# Patient Record
Sex: FEMALE | Race: WHITE | Employment: UNEMPLOYED | URBAN - METROPOLITAN AREA
[De-identification: names, ages, dates, MRNs, and addresses within clinical notes are randomized per-mention and may not be internally consistent; named-entity substitution may affect disease eponyms.]

---

## 2021-04-06 PROBLEM — Z34.00 SUPERVISION OF NORMAL FIRST PREGNANCY, ANTEPARTUM: Status: ACTIVE | Noted: 2021-04-06

## 2021-04-06 PROBLEM — Z34.00 SUPERVISION OF NORMAL FIRST PREGNANCY, ANTEPARTUM (HCC): Status: ACTIVE | Noted: 2021-04-06

## 2021-05-25 ENCOUNTER — APPOINTMENT (OUTPATIENT)
Dept: CT IMAGING | Facility: HOSPITAL | Age: 31
End: 2021-05-25
Attending: OBSTETRICS & GYNECOLOGY
Payer: COMMERCIAL

## 2021-05-25 ENCOUNTER — HOSPITAL ENCOUNTER (OUTPATIENT)
Facility: HOSPITAL | Age: 31
Setting detail: OBSERVATION
Discharge: HOME | End: 2021-05-25
Attending: OBSTETRICS & GYNECOLOGY | Admitting: OBSTETRICS & GYNECOLOGY
Payer: COMMERCIAL

## 2021-05-25 VITALS
TEMPERATURE: 98 F | BODY MASS INDEX: 24.33 KG/M2 | WEIGHT: 155 LBS | HEART RATE: 71 BPM | RESPIRATION RATE: 20 BRPM | SYSTOLIC BLOOD PRESSURE: 92 MMHG | HEIGHT: 67 IN | DIASTOLIC BLOOD PRESSURE: 57 MMHG

## 2021-05-25 PROBLEM — Z34.90 PREGNANCY (HCC): Status: ACTIVE | Noted: 2021-05-25

## 2021-05-25 PROBLEM — Z34.90 PREGNANCY: Status: ACTIVE | Noted: 2021-05-25

## 2021-05-25 PROCEDURE — 59025 FETAL NON-STRESS TEST: CPT

## 2021-05-25 PROCEDURE — 70450 CT HEAD/BRAIN W/O DYE: CPT | Performed by: OBSTETRICS & GYNECOLOGY

## 2021-05-25 PROCEDURE — 4A0HXCZ MEASUREMENT OF PRODUCTS OF CONCEPTION, CARDIAC RATE, EXTERNAL APPROACH: ICD-10-PCS | Performed by: OBSTETRICS & GYNECOLOGY

## 2021-05-25 PROCEDURE — 99204 OFFICE O/P NEW MOD 45 MIN: CPT

## 2021-05-25 RX ORDER — HYDROMORPHONE HYDROCHLORIDE 1 MG/ML
1 INJECTION, SOLUTION INTRAMUSCULAR; INTRAVENOUS; SUBCUTANEOUS ONCE
Status: DISCONTINUED | OUTPATIENT
Start: 2021-05-25 | End: 2021-05-25

## 2021-05-25 RX ORDER — HYDROCODONE BITARTRATE AND ACETAMINOPHEN 5; 325 MG/1; MG/1
1 TABLET ORAL EVERY 6 HOURS PRN
Status: DISCONTINUED | OUTPATIENT
Start: 2021-05-25 | End: 2021-05-25

## 2021-05-25 NOTE — PROGRESS NOTES
Pt is a 28year old female admitted to TRG2/TRG2-A. Pt is  33w0d intra-uterine pregnancy.     Patient presents with:   Complication: Pt presents to L&D triage w/ c/o intense headache x9 days, was told ER wait was 2+ hours     Pt states she

## 2021-05-25 NOTE — PROGRESS NOTES
As this RN was speaking to ER charge RN to coordinate transfer of pt for further evaluation, 3 minute prolonged FHR deceleration occurred. RN entered room, pt states she felt a very large fetal movement, pt repositioned to left side, EFM adjusted.  Dr. Doris Angel

## 2021-05-26 NOTE — NST
Nonstress Test   Patient: Chinedu Garcia    Gestation: 33w0d    NST:       Variability: Moderate           Accelerations: Yes           Decelerations: Variable            Baseline: 130 BPM           Uterine Irritability: Yes           Contractions: Not pres

## 2021-05-26 NOTE — PROGRESS NOTES
Pt discharged to home with headache of 6/10 after norco. Instructions given to call office tomarrtoshia

## 2021-07-10 ENCOUNTER — HOSPITAL ENCOUNTER (INPATIENT)
Facility: HOSPITAL | Age: 31
LOS: 3 days | Discharge: HOME | End: 2021-07-13
Attending: OBSTETRICS & GYNECOLOGY | Admitting: OBSTETRICS & GYNECOLOGY
Payer: COMMERCIAL

## 2021-07-10 PROCEDURE — 99214 OFFICE O/P EST MOD 30 MIN: CPT

## 2021-07-11 ENCOUNTER — ANESTHESIA EVENT (OUTPATIENT)
Dept: OBGYN UNIT | Facility: HOSPITAL | Age: 31
End: 2021-07-11
Payer: COMMERCIAL

## 2021-07-11 ENCOUNTER — ANESTHESIA (OUTPATIENT)
Dept: OBGYN UNIT | Facility: HOSPITAL | Age: 31
End: 2021-07-11
Payer: COMMERCIAL

## 2021-07-11 LAB
ANTIBODY SCREEN: NEGATIVE
BASOPHILS # BLD AUTO: 0.03 X10(3) UL (ref 0–0.2)
BASOPHILS NFR BLD AUTO: 0.3 %
DEPRECATED RDW RBC AUTO: 45.7 FL (ref 35.1–46.3)
EOSINOPHIL # BLD AUTO: 0.03 X10(3) UL (ref 0–0.7)
EOSINOPHIL NFR BLD AUTO: 0.3 %
ERYTHROCYTE [DISTWIDTH] IN BLOOD BY AUTOMATED COUNT: 13.4 % (ref 11–15)
HCT VFR BLD AUTO: 40.6 %
HGB BLD-MCNC: 13.4 G/DL
IMM GRANULOCYTES # BLD AUTO: 0.03 X10(3) UL (ref 0–1)
IMM GRANULOCYTES NFR BLD: 0.3 %
LYMPHOCYTES # BLD AUTO: 2.03 X10(3) UL (ref 1–4)
LYMPHOCYTES NFR BLD AUTO: 22.8 %
MCH RBC QN AUTO: 30.5 PG (ref 26–34)
MCHC RBC AUTO-ENTMCNC: 33 G/DL (ref 31–37)
MCV RBC AUTO: 92.5 FL
MONOCYTES # BLD AUTO: 0.5 X10(3) UL (ref 0.1–1)
MONOCYTES NFR BLD AUTO: 5.6 %
NEUTROPHILS # BLD AUTO: 6.3 X10 (3) UL (ref 1.5–7.7)
NEUTROPHILS # BLD AUTO: 6.3 X10(3) UL (ref 1.5–7.7)
NEUTROPHILS NFR BLD AUTO: 70.7 %
PLATELET # BLD AUTO: 160 10(3)UL (ref 150–450)
RBC # BLD AUTO: 4.39 X10(6)UL
RH BLOOD TYPE: POSITIVE
T PALLIDUM AB SER QL IA: NONREACTIVE
WBC # BLD AUTO: 8.9 X10(3) UL (ref 4–11)

## 2021-07-11 PROCEDURE — 86900 BLOOD TYPING SEROLOGIC ABO: CPT | Performed by: OBSTETRICS & GYNECOLOGY

## 2021-07-11 PROCEDURE — 86901 BLOOD TYPING SEROLOGIC RH(D): CPT | Performed by: OBSTETRICS & GYNECOLOGY

## 2021-07-11 PROCEDURE — 0UQMXZZ REPAIR VULVA, EXTERNAL APPROACH: ICD-10-PCS | Performed by: OBSTETRICS & GYNECOLOGY

## 2021-07-11 PROCEDURE — 0KQM0ZZ REPAIR PERINEUM MUSCLE, OPEN APPROACH: ICD-10-PCS | Performed by: OBSTETRICS & GYNECOLOGY

## 2021-07-11 PROCEDURE — 85025 COMPLETE CBC W/AUTO DIFF WBC: CPT | Performed by: OBSTETRICS & GYNECOLOGY

## 2021-07-11 PROCEDURE — 86850 RBC ANTIBODY SCREEN: CPT | Performed by: OBSTETRICS & GYNECOLOGY

## 2021-07-11 PROCEDURE — 86780 TREPONEMA PALLIDUM: CPT | Performed by: OBSTETRICS & GYNECOLOGY

## 2021-07-11 RX ORDER — LIDOCAINE HYDROCHLORIDE AND EPINEPHRINE 15; 5 MG/ML; UG/ML
INJECTION, SOLUTION EPIDURAL AS NEEDED
Status: DISCONTINUED | OUTPATIENT
Start: 2021-07-11 | End: 2021-07-11 | Stop reason: SURG

## 2021-07-11 RX ORDER — IBUPROFEN 600 MG/1
600 TABLET ORAL EVERY 6 HOURS
Status: DISCONTINUED | OUTPATIENT
Start: 2021-07-11 | End: 2021-07-13

## 2021-07-11 RX ORDER — DEXTROSE, SODIUM CHLORIDE, SODIUM LACTATE, POTASSIUM CHLORIDE, AND CALCIUM CHLORIDE 5; .6; .31; .03; .02 G/100ML; G/100ML; G/100ML; G/100ML; G/100ML
INJECTION, SOLUTION INTRAVENOUS AS NEEDED
Status: DISCONTINUED | OUTPATIENT
Start: 2021-07-11 | End: 2021-07-11

## 2021-07-11 RX ORDER — SIMETHICONE 80 MG
80 TABLET,CHEWABLE ORAL 3 TIMES DAILY PRN
Status: DISCONTINUED | OUTPATIENT
Start: 2021-07-11 | End: 2021-07-13

## 2021-07-11 RX ORDER — DOCUSATE SODIUM 100 MG/1
100 CAPSULE, LIQUID FILLED ORAL
Status: DISCONTINUED | OUTPATIENT
Start: 2021-07-11 | End: 2021-07-13

## 2021-07-11 RX ORDER — ACETAMINOPHEN 325 MG/1
650 TABLET ORAL EVERY 6 HOURS PRN
Status: DISCONTINUED | OUTPATIENT
Start: 2021-07-11 | End: 2021-07-13

## 2021-07-11 RX ORDER — ONDANSETRON 2 MG/ML
4 INJECTION INTRAMUSCULAR; INTRAVENOUS EVERY 6 HOURS PRN
Status: DISCONTINUED | OUTPATIENT
Start: 2021-07-11 | End: 2021-07-11

## 2021-07-11 RX ORDER — HYDROCODONE BITARTRATE AND ACETAMINOPHEN 5; 325 MG/1; MG/1
1 TABLET ORAL EVERY 6 HOURS PRN
Status: DISCONTINUED | OUTPATIENT
Start: 2021-07-11 | End: 2021-07-12

## 2021-07-11 RX ORDER — HYDROMORPHONE HYDROCHLORIDE 1 MG/ML
1 INJECTION, SOLUTION INTRAMUSCULAR; INTRAVENOUS; SUBCUTANEOUS EVERY 2 HOUR PRN
Status: DISCONTINUED | OUTPATIENT
Start: 2021-07-11 | End: 2021-07-11

## 2021-07-11 RX ORDER — SODIUM CHLORIDE, SODIUM LACTATE, POTASSIUM CHLORIDE, CALCIUM CHLORIDE 600; 310; 30; 20 MG/100ML; MG/100ML; MG/100ML; MG/100ML
INJECTION, SOLUTION INTRAVENOUS CONTINUOUS
Status: DISCONTINUED | OUTPATIENT
Start: 2021-07-11 | End: 2021-07-11

## 2021-07-11 RX ORDER — IBUPROFEN 600 MG/1
600 TABLET ORAL EVERY 6 HOURS PRN
Status: DISCONTINUED | OUTPATIENT
Start: 2021-07-11 | End: 2021-07-11

## 2021-07-11 RX ORDER — TRISODIUM CITRATE DIHYDRATE AND CITRIC ACID MONOHYDRATE 500; 334 MG/5ML; MG/5ML
30 SOLUTION ORAL AS NEEDED
Status: DISCONTINUED | OUTPATIENT
Start: 2021-07-11 | End: 2021-07-11

## 2021-07-11 RX ORDER — BUPIVACAINE HCL/0.9 % NACL/PF 0.25 %
5 PLASTIC BAG, INJECTION (ML) EPIDURAL AS NEEDED
Status: DISCONTINUED | OUTPATIENT
Start: 2021-07-11 | End: 2021-07-11

## 2021-07-11 RX ORDER — ACETAMINOPHEN 500 MG
500 TABLET ORAL EVERY 6 HOURS PRN
Status: DISCONTINUED | OUTPATIENT
Start: 2021-07-11 | End: 2021-07-11

## 2021-07-11 RX ORDER — LIDOCAINE HYDROCHLORIDE 10 MG/ML
INJECTION, SOLUTION EPIDURAL; INFILTRATION; INTRACAUDAL; PERINEURAL AS NEEDED
Status: DISCONTINUED | OUTPATIENT
Start: 2021-07-11 | End: 2021-07-11 | Stop reason: SURG

## 2021-07-11 RX ORDER — TERBUTALINE SULFATE 1 MG/ML
0.25 INJECTION, SOLUTION SUBCUTANEOUS AS NEEDED
Status: DISCONTINUED | OUTPATIENT
Start: 2021-07-11 | End: 2021-07-11

## 2021-07-11 RX ORDER — AMMONIA INHALANTS 0.04 G/.3ML
0.3 INHALANT RESPIRATORY (INHALATION) AS NEEDED
Status: DISCONTINUED | OUTPATIENT
Start: 2021-07-11 | End: 2021-07-11

## 2021-07-11 RX ORDER — NALBUPHINE HCL 10 MG/ML
2.5 AMPUL (ML) INJECTION
Status: DISCONTINUED | OUTPATIENT
Start: 2021-07-11 | End: 2021-07-11

## 2021-07-11 RX ORDER — BISACODYL 10 MG
10 SUPPOSITORY, RECTAL RECTAL ONCE AS NEEDED
Status: DISCONTINUED | OUTPATIENT
Start: 2021-07-11 | End: 2021-07-13

## 2021-07-11 RX ADMIN — LIDOCAINE HYDROCHLORIDE 3 ML: 10 INJECTION, SOLUTION EPIDURAL; INFILTRATION; INTRACAUDAL; PERINEURAL at 02:55:00

## 2021-07-11 RX ADMIN — LIDOCAINE HYDROCHLORIDE AND EPINEPHRINE 3 ML: 15; 5 INJECTION, SOLUTION EPIDURAL at 02:59:00

## 2021-07-11 NOTE — PROGRESS NOTES
SUBJECTIVE:   pt without complaints. Comfortable s/p epidural    OBJECTIVE:  VS:  blood pressure is 105/66 and her pulse is 82.      Fetal Surveillance:  Fetal heart variability: moderate  Fetal Heart Rate decelerations: variable  Fetal Heart Rate accelera

## 2021-07-11 NOTE — H&P
[address] and Delivery Prenatal History and Physical Interval Addendum  Please see full Prenatal Record for this pregnancy      SUBJECTIVE:    Interval History: This is a 28year old  at 39w5d admitted for painful contractions.

## 2021-07-11 NOTE — PLAN OF CARE
Problem: BIRTH - VAGINAL/ SECTION  Goal: Fetal and maternal status remain reassuring during the birth process  Description: INTERVENTIONS:  - Monitor vital signs  - Monitor fetal heart rate  - Monitor uterine activity  - Monitor labor progression protocol  - Administer induction/augmentation agents as ordered/indicated  - Utilize comfort measures and position changes to increase comfort and help labor progress  - See additional Care Plan goals for specific interventions  7/11/2021 1739 by Reed,

## 2021-07-11 NOTE — ANESTHESIA PREPROCEDURE EVALUATION
PRE-OP EVALUATION    Patient Name: Meghan Reese    Admit Diagnosis: preg state  Pregnancy    Pre-op Diagnosis: * No surgery found *        Anesthesia Procedure: LABOR ANALGESIA    * Surgery not found *    Pre-op vitals reviewed.         There is no height time  IRON OR, , Disp: , Rfl: , 7/10/2021 at Unknown time  Magnesium 200 MG Oral Tab, Take by mouth., Disp: , Rfl: , 7/10/2021 at Unknown time  Multiple Vitamin (MULTI-VITAMIN DAILY) Oral Tab, Take by mouth., Disp: , Rfl:         Allergies: Patient has no Cardiovascular    Cardiovascular exam normal.  Rhythm: regular  Rate: normal     Dental    No notable dental history. Pulmonary    Pulmonary exam normal.  Breath sounds clear to auscultation bilaterally.                Other findings            ASA:

## 2021-07-11 NOTE — PROGRESS NOTES
Dr. Oli Conrad at bedside with this RN and Romeo Muller, charge RN to evaluate patient. Assessment WNL. Dr. Oli Conrad states to administer 1mg Dilaudid at this time, and pt may have Norco 5mg in addition to Ibuprofen and Tylenol for pain management.

## 2021-07-11 NOTE — L&D DELIVERY NOTE
[facility] [[id number]]    Labor Events     labor?: No   steroids?: None  Antibiotics received during labor?: No  Antibiotics (enter # doses in comment): none  Rupture date/time: 2021 1008     Rupture type: AROM  Fluid color: Clear, P hypoventilation Good, crying              1 Minute:  5 Minute:  10 Minute:  15 Minute:  20 Minute:    Skin color: 0  1       Heart rate: 2  2       Reflex irritablity: 2  2       Muscle tone: 2  2       Respiratory effort: 2  2       Total: 8  9          A

## 2021-07-11 NOTE — PLAN OF CARE
Problem: BIRTH - VAGINAL/ SECTION  Goal: Fetal and maternal status remain reassuring during the birth process  Description: INTERVENTIONS:  - Monitor vital signs  - Monitor fetal heart rate  - Monitor uterine activity  - Monitor labor progression appropriate  - Consider OT/PT consult to assist with strengthening/mobility  - Encourage toileting schedule  Outcome: Progressing

## 2021-07-11 NOTE — PROGRESS NOTES
28year old  at 39+4 weeks gestation presents to triage with c/o painful contractions since 8pm.  Pt states that she has been josé miguel all day but they got more intense at 8.   Pt visibly uncomfortable and breathing and moaning through her contractio

## 2021-07-11 NOTE — ANESTHESIA PROCEDURE NOTES
Labor Analgesia  Performed by: Linda Yu MD  Authorized by: Linda Yu MD       General Information and Staff    Start Time:  7/11/2021 2:55 AM  End Time:  7/11/2021 2:58 AM  Anesthesiologist:  Linda Yu MD  Performed by:   Anesthesiologist  Patient

## 2021-07-11 NOTE — PROGRESS NOTES
This RN and JUDY Cabrera OBT at bedside to assist pt to bathroom. Patient became light headed and nonresponsive. This RN and JUDY Cabrera OBT laid patient back down and provided ammonia. Vital signs WNL, lochia WNL. IV fluids restarted.  Perineum WNL, this RN noted s

## 2021-07-12 LAB
BASOPHILS # BLD AUTO: 0.03 X10(3) UL (ref 0–0.2)
BASOPHILS NFR BLD AUTO: 0.3 %
DEPRECATED RDW RBC AUTO: 46.4 FL (ref 35.1–46.3)
EOSINOPHIL # BLD AUTO: 0.05 X10(3) UL (ref 0–0.7)
EOSINOPHIL NFR BLD AUTO: 0.4 %
ERYTHROCYTE [DISTWIDTH] IN BLOOD BY AUTOMATED COUNT: 13.5 % (ref 11–15)
HCT VFR BLD AUTO: 27.9 %
HGB BLD-MCNC: 9.1 G/DL
IMM GRANULOCYTES # BLD AUTO: 0.04 X10(3) UL (ref 0–1)
IMM GRANULOCYTES NFR BLD: 0.3 %
LYMPHOCYTES # BLD AUTO: 2.62 X10(3) UL (ref 1–4)
LYMPHOCYTES NFR BLD AUTO: 22.6 %
MCH RBC QN AUTO: 30.7 PG (ref 26–34)
MCHC RBC AUTO-ENTMCNC: 32.6 G/DL (ref 31–37)
MCV RBC AUTO: 94.3 FL
MONOCYTES # BLD AUTO: 0.64 X10(3) UL (ref 0.1–1)
MONOCYTES NFR BLD AUTO: 5.5 %
NEUTROPHILS # BLD AUTO: 8.21 X10 (3) UL (ref 1.5–7.7)
NEUTROPHILS # BLD AUTO: 8.21 X10(3) UL (ref 1.5–7.7)
NEUTROPHILS NFR BLD AUTO: 70.9 %
PLATELET # BLD AUTO: 126 10(3)UL (ref 150–450)
RBC # BLD AUTO: 2.96 X10(6)UL
WBC # BLD AUTO: 11.6 X10(3) UL (ref 4–11)

## 2021-07-12 PROCEDURE — 85025 COMPLETE CBC W/AUTO DIFF WBC: CPT | Performed by: OBSTETRICS & GYNECOLOGY

## 2021-07-12 RX ORDER — KETOROLAC TROMETHAMINE 30 MG/ML
30 INJECTION, SOLUTION INTRAMUSCULAR; INTRAVENOUS EVERY 6 HOURS PRN
Status: COMPLETED | OUTPATIENT
Start: 2021-07-12 | End: 2021-07-12

## 2021-07-12 RX ORDER — HYDROCODONE BITARTRATE AND ACETAMINOPHEN 5; 325 MG/1; MG/1
1 TABLET ORAL EVERY 6 HOURS PRN
Status: DISCONTINUED | OUTPATIENT
Start: 2021-07-12 | End: 2021-07-13

## 2021-07-12 RX ORDER — HYDROCODONE BITARTRATE AND ACETAMINOPHEN 5; 325 MG/1; MG/1
1 TABLET ORAL EVERY 6 HOURS PRN
Status: DISCONTINUED | OUTPATIENT
Start: 2021-07-12 | End: 2021-07-12

## 2021-07-12 RX ORDER — HYDROCODONE BITARTRATE AND ACETAMINOPHEN 10; 325 MG/1; MG/1
1 TABLET ORAL EVERY 6 HOURS PRN
Status: DISCONTINUED | OUTPATIENT
Start: 2021-07-12 | End: 2021-07-13

## 2021-07-12 RX ORDER — KETOROLAC TROMETHAMINE 30 MG/ML
30 INJECTION, SOLUTION INTRAMUSCULAR; INTRAVENOUS EVERY 6 HOURS PRN
Status: COMPLETED | OUTPATIENT
Start: 2021-07-12 | End: 2021-07-13

## 2021-07-12 NOTE — PROGRESS NOTES
Postpartum Day 1    Pt without complaints.     Temp:  [97.5 °F (36.4 °C)-98.8 °F (37.1 °C)] 98.3 °F (36.8 °C)  Pulse:  [] 69  Resp:  [14-22] 14  BP: ()/(50-87) 104/58  abd  soft, NT, ND, fundus firm below umbilicus  perineum NL lochia  extr  tra

## 2021-07-12 NOTE — PROGRESS NOTES
Pt assisted to wheelchair. Tolerated well. Pt transferred to room 1113 via wheelchair in stable condition. Report to Marshall Moy RN to assume care. Bands checked and verified on arrival to unit.

## 2021-07-12 NOTE — PROGRESS NOTES
Report to Rene Art RN, care endorsed, all questions answered at this time.  Pt and infant left in stable condition

## 2021-07-12 NOTE — PROGRESS NOTES
Labor Analgesia Follow Up Note    Patient underwent epidural anesthesia for labor analgesia,    Placenta Date/Time: 7/11/2021  4:08 PM    Delivery Date/Time[de-identified] 7/11/2021  4:04 PM    /58 (BP Location: Right arm)   Pulse 69   Temp 98.3 °F (36.8 °C) (Ora

## 2021-07-12 NOTE — PLAN OF CARE
NURSING ADMISSION NOTE  Patient admitted via Wheelchair  Oriented to room. Safety precautions initiated. Bed in low position. Call light in reach. MOVING SLOWLY, STILL C/O PERINEAL PAIN.  AIVCI4LQ/TYLENOL GIVEN WITH SOME RELIEF.  @2200 VIKY YOUNG

## 2021-07-13 VITALS
SYSTOLIC BLOOD PRESSURE: 113 MMHG | RESPIRATION RATE: 16 BRPM | TEMPERATURE: 98 F | HEART RATE: 77 BPM | DIASTOLIC BLOOD PRESSURE: 78 MMHG

## 2021-07-13 NOTE — PROGRESS NOTES
Discharge patient home as order. Teaching complete, patient feel comfortable to take care herself and  infant. Hugs and kisses off.  Sent both mom and infant to their family home at

## 2021-07-13 NOTE — PROGRESS NOTES
Postpartum Day 2    Pt without complaints.     Temp:  [99.1 °F (37.3 °C)] 99.1 °F (37.3 °C)  Pulse:  [89] 89  Resp:  [20] 20  BP: (112)/(69) 112/69  abd  soft, NT, ND, fundus firm below umbilicus  perineum NL lochia  extr  trace edema, no calf tenderness  r

## 2021-07-14 ENCOUNTER — HOSPITAL ENCOUNTER (EMERGENCY)
Facility: HOSPITAL | Age: 31
Discharge: HOME | End: 2021-07-14
Attending: EMERGENCY MEDICINE
Payer: COMMERCIAL

## 2021-07-14 VITALS
RESPIRATION RATE: 17 BRPM | WEIGHT: 168 LBS | BODY MASS INDEX: 27.99 KG/M2 | DIASTOLIC BLOOD PRESSURE: 74 MMHG | HEIGHT: 65 IN | TEMPERATURE: 99 F | SYSTOLIC BLOOD PRESSURE: 106 MMHG | OXYGEN SATURATION: 99 % | HEART RATE: 101 BPM

## 2021-07-14 DIAGNOSIS — R10.2 PERINEAL PAIN: ICD-10-CM

## 2021-07-14 DIAGNOSIS — K64.4 EXTERNAL HEMORRHOID: Primary | ICD-10-CM

## 2021-07-14 PROCEDURE — 96374 THER/PROPH/DIAG INJ IV PUSH: CPT

## 2021-07-14 PROCEDURE — 99285 EMERGENCY DEPT VISIT HI MDM: CPT

## 2021-07-14 PROCEDURE — 99284 EMERGENCY DEPT VISIT MOD MDM: CPT

## 2021-07-14 RX ORDER — HYDROCODONE BITARTRATE AND ACETAMINOPHEN 5; 325 MG/1; MG/1
1-2 TABLET ORAL EVERY 6 HOURS PRN
Qty: 12 TABLET | Refills: 0 | Status: SHIPPED | OUTPATIENT
Start: 2021-07-14 | End: 2021-07-14

## 2021-07-14 RX ORDER — HYDROMORPHONE HYDROCHLORIDE 1 MG/ML
0.5 INJECTION, SOLUTION INTRAMUSCULAR; INTRAVENOUS; SUBCUTANEOUS ONCE
Status: COMPLETED | OUTPATIENT
Start: 2021-07-14 | End: 2021-07-14

## 2021-07-14 RX ORDER — DIBUCAINE 0.28 G/28G
1 OINTMENT TOPICAL 3 TIMES DAILY
Qty: 1 EACH | Refills: 0 | Status: SHIPPED | OUTPATIENT
Start: 2021-07-14

## 2021-07-14 RX ORDER — HYDROCODONE BITARTRATE AND ACETAMINOPHEN 5; 325 MG/1; MG/1
1-2 TABLET ORAL EVERY 6 HOURS PRN
Qty: 12 TABLET | Refills: 0 | Status: SHIPPED | OUTPATIENT
Start: 2021-07-14 | End: 2021-07-21

## 2021-07-14 RX ORDER — IBUPROFEN 600 MG/1
600 TABLET ORAL ONCE
Status: COMPLETED | OUTPATIENT
Start: 2021-07-14 | End: 2021-07-14

## 2021-07-14 RX ORDER — DIBUCAINE 0.28 G/28G
1 OINTMENT TOPICAL 3 TIMES DAILY
Qty: 1 EACH | Refills: 0 | Status: SHIPPED | OUTPATIENT
Start: 2021-07-14 | End: 2021-07-14

## 2021-07-14 RX ORDER — ACETAMINOPHEN 500 MG
1000 TABLET ORAL ONCE
Status: COMPLETED | OUTPATIENT
Start: 2021-07-14 | End: 2021-07-14

## 2021-07-14 NOTE — ED INITIAL ASSESSMENT (HPI)
Pt to ED with c/o anal pain. Pt had vaginal delivery on 7/11 and sts her rectum tore and sutures were applied. Pt sts she also has a hemorrhoid and pain has been increasing.

## 2021-07-16 ENCOUNTER — TELEPHONE (OUTPATIENT)
Dept: OBGYN UNIT | Facility: HOSPITAL | Age: 31
End: 2021-07-16

## 2021-07-16 NOTE — PROGRESS NOTES
07/16/21 1621   Cradle Call Follow up   Cradle call follow up date 07/16/21   Follow up needed Completed   Hypertension or Preeclampsia during pregnancy or delivery No   Outgoing Cradle Call completed: Mom reports that infant are doing well.  Pt herse

## 2021-07-21 ENCOUNTER — NURSE ONLY (OUTPATIENT)
Dept: LACTATION | Facility: HOSPITAL | Age: 31
End: 2021-07-21
Attending: OBSTETRICS & GYNECOLOGY
Payer: COMMERCIAL

## 2021-07-21 VITALS — TEMPERATURE: 98 F

## 2021-07-21 PROCEDURE — 99213 OFFICE O/P EST LOW 20 MIN: CPT

## 2021-08-18 PROBLEM — Z34.00 SUPERVISION OF NORMAL FIRST PREGNANCY, ANTEPARTUM (HCC): Status: RESOLVED | Noted: 2021-04-06 | Resolved: 2021-08-18

## 2021-08-18 PROBLEM — Z34.00 SUPERVISION OF NORMAL FIRST PREGNANCY, ANTEPARTUM: Status: RESOLVED | Noted: 2021-04-06 | Resolved: 2021-08-18

## 2021-08-31 PROBLEM — Z34.90 PREGNANCY: Status: RESOLVED | Noted: 2021-05-25 | Resolved: 2021-08-31

## 2021-08-31 PROBLEM — Z34.90 PREGNANCY (HCC): Status: RESOLVED | Noted: 2021-05-25 | Resolved: 2021-08-31

## 2024-08-06 NOTE — ED PROVIDER NOTES
Patient Seen in: [facility] Emergency Department      History   Patient presents with:  Anal Problem    Stated Complaint: pain post partum    HPI/Subjective:   HPI    This is a 26-year-old woman G1, P1, 3 days postpartum here with complaint of perin Xanax as needed for anxiety. Potassium as directed tomorrow. Drink plenty of fluids. Return for worse condition.    moist.   Cardiovascular:  Normal rate and regular rhythm. No Edema  Pulmonary:  Pulmonary effort is normal.  Normal breath sounds. No wheezing, rhonchi or rales.    Abdominal: Soft nontender nondistended, normal bowel sounds, no guarding no rebound tendern List    START taking these medications    dibucaine 1 % External Ointment  Apply 1 Application topically 3 (three) times daily.   Qty: 1 each Refills: 0    HYDROcodone-acetaminophen 5-325 MG Oral Tab  Take 1-2 tablets by mouth every 6 (six) hours as needed

## 2024-09-17 ENCOUNTER — ANESTHESIA (OUTPATIENT)
Dept: SURGERY | Facility: HOSPITAL | Age: 34
End: 2024-09-17
Payer: COMMERCIAL

## 2024-09-17 ENCOUNTER — HOSPITAL ENCOUNTER (EMERGENCY)
Facility: HOSPITAL | Age: 34
Discharge: HOME | End: 2024-09-17
Attending: EMERGENCY MEDICINE
Payer: COMMERCIAL

## 2024-09-17 ENCOUNTER — ANESTHESIA EVENT (OUTPATIENT)
Dept: SURGERY | Facility: HOSPITAL | Age: 34
End: 2024-09-17
Payer: COMMERCIAL

## 2024-09-17 ENCOUNTER — APPOINTMENT (OUTPATIENT)
Dept: CT IMAGING | Facility: HOSPITAL | Age: 34
End: 2024-09-17
Attending: EMERGENCY MEDICINE
Payer: COMMERCIAL

## 2024-09-17 VITALS
HEART RATE: 63 BPM | OXYGEN SATURATION: 100 % | RESPIRATION RATE: 20 BRPM | WEIGHT: 140 LBS | BODY MASS INDEX: 22.5 KG/M2 | DIASTOLIC BLOOD PRESSURE: 77 MMHG | HEIGHT: 66 IN | SYSTOLIC BLOOD PRESSURE: 116 MMHG | TEMPERATURE: 97 F

## 2024-09-17 DIAGNOSIS — K35.80 ACUTE APPENDICITIS, UNSPECIFIED ACUTE APPENDICITIS TYPE: Primary | ICD-10-CM

## 2024-09-17 DIAGNOSIS — K37 APPENDICITIS: ICD-10-CM

## 2024-09-17 PROCEDURE — 88304 TISSUE EXAM BY PATHOLOGIST: CPT | Performed by: SURGERY

## 2024-09-17 PROCEDURE — 96365 THER/PROPH/DIAG IV INF INIT: CPT

## 2024-09-17 PROCEDURE — 74177 CT ABD & PELVIS W/CONTRAST: CPT | Performed by: EMERGENCY MEDICINE

## 2024-09-17 PROCEDURE — 0DTJ4ZZ RESECTION OF APPENDIX, PERCUTANEOUS ENDOSCOPIC APPROACH: ICD-10-PCS | Performed by: SURGERY

## 2024-09-17 PROCEDURE — 96375 TX/PRO/DX INJ NEW DRUG ADDON: CPT

## 2024-09-17 PROCEDURE — 99285 EMERGENCY DEPT VISIT HI MDM: CPT

## 2024-09-17 RX ORDER — SODIUM CHLORIDE, SODIUM LACTATE, POTASSIUM CHLORIDE, CALCIUM CHLORIDE 600; 310; 30; 20 MG/100ML; MG/100ML; MG/100ML; MG/100ML
INJECTION, SOLUTION INTRAVENOUS CONTINUOUS
Status: DISCONTINUED | OUTPATIENT
Start: 2024-09-17 | End: 2024-09-17

## 2024-09-17 RX ORDER — HYDROMORPHONE HYDROCHLORIDE 1 MG/ML
0.6 INJECTION, SOLUTION INTRAMUSCULAR; INTRAVENOUS; SUBCUTANEOUS EVERY 5 MIN PRN
Status: DISCONTINUED | OUTPATIENT
Start: 2024-09-17 | End: 2024-09-17

## 2024-09-17 RX ORDER — METRONIDAZOLE 500 MG/100ML
INJECTION, SOLUTION INTRAVENOUS AS NEEDED
Status: DISCONTINUED | OUTPATIENT
Start: 2024-09-17 | End: 2024-09-19 | Stop reason: SURG

## 2024-09-17 RX ORDER — DEXAMETHASONE SODIUM PHOSPHATE 4 MG/ML
VIAL (ML) INJECTION AS NEEDED
Status: DISCONTINUED | OUTPATIENT
Start: 2024-09-17 | End: 2024-09-19 | Stop reason: SURG

## 2024-09-17 RX ORDER — ACETAMINOPHEN 500 MG
1000 TABLET ORAL ONCE AS NEEDED
Status: COMPLETED | OUTPATIENT
Start: 2024-09-17 | End: 2024-09-17

## 2024-09-17 RX ORDER — LIDOCAINE HYDROCHLORIDE 10 MG/ML
INJECTION, SOLUTION EPIDURAL; INFILTRATION; INTRACAUDAL; PERINEURAL AS NEEDED
Status: DISCONTINUED | OUTPATIENT
Start: 2024-09-17 | End: 2024-09-19 | Stop reason: SURG

## 2024-09-17 RX ORDER — HYDROMORPHONE HYDROCHLORIDE 1 MG/ML
0.5 INJECTION, SOLUTION INTRAMUSCULAR; INTRAVENOUS; SUBCUTANEOUS EVERY 30 MIN PRN
Status: DISCONTINUED | OUTPATIENT
Start: 2024-09-17 | End: 2024-09-17

## 2024-09-17 RX ORDER — TRAZODONE HYDROCHLORIDE 100 MG/1
100 TABLET ORAL NIGHTLY PRN
COMMUNITY

## 2024-09-17 RX ORDER — ROCURONIUM BROMIDE 10 MG/ML
INJECTION, SOLUTION INTRAVENOUS AS NEEDED
Status: DISCONTINUED | OUTPATIENT
Start: 2024-09-17 | End: 2024-09-19 | Stop reason: SURG

## 2024-09-17 RX ORDER — KETOROLAC TROMETHAMINE 30 MG/ML
INJECTION, SOLUTION INTRAMUSCULAR; INTRAVENOUS AS NEEDED
Status: DISCONTINUED | OUTPATIENT
Start: 2024-09-17 | End: 2024-09-19 | Stop reason: SURG

## 2024-09-17 RX ORDER — PROCHLORPERAZINE EDISYLATE 5 MG/ML
5 INJECTION INTRAMUSCULAR; INTRAVENOUS EVERY 8 HOURS PRN
Status: DISCONTINUED | OUTPATIENT
Start: 2024-09-17 | End: 2024-09-17

## 2024-09-17 RX ORDER — NALOXONE HYDROCHLORIDE 0.4 MG/ML
0.08 INJECTION, SOLUTION INTRAMUSCULAR; INTRAVENOUS; SUBCUTANEOUS AS NEEDED
Status: DISCONTINUED | OUTPATIENT
Start: 2024-09-17 | End: 2024-09-17

## 2024-09-17 RX ORDER — HYDROMORPHONE HYDROCHLORIDE 1 MG/ML
0.4 INJECTION, SOLUTION INTRAMUSCULAR; INTRAVENOUS; SUBCUTANEOUS EVERY 5 MIN PRN
Status: DISCONTINUED | OUTPATIENT
Start: 2024-09-17 | End: 2024-09-17

## 2024-09-17 RX ORDER — NEOSTIGMINE METHYLSULFATE 1 MG/ML
INJECTION INTRAVENOUS AS NEEDED
Status: DISCONTINUED | OUTPATIENT
Start: 2024-09-17 | End: 2024-09-19 | Stop reason: SURG

## 2024-09-17 RX ORDER — DROSPIRENONE AND ETHINYL ESTRADIOL 0.02-3(28)
1 KIT ORAL DAILY
COMMUNITY
Start: 2024-08-20 | End: 2025-08-20

## 2024-09-17 RX ORDER — HYDROCODONE BITARTRATE AND ACETAMINOPHEN 5; 325 MG/1; MG/1
2 TABLET ORAL ONCE AS NEEDED
Status: COMPLETED | OUTPATIENT
Start: 2024-09-17 | End: 2024-09-17

## 2024-09-17 RX ORDER — GLYCOPYRROLATE 0.2 MG/ML
INJECTION, SOLUTION INTRAMUSCULAR; INTRAVENOUS AS NEEDED
Status: DISCONTINUED | OUTPATIENT
Start: 2024-09-17 | End: 2024-09-19 | Stop reason: SURG

## 2024-09-17 RX ORDER — ONDANSETRON 2 MG/ML
4 INJECTION INTRAMUSCULAR; INTRAVENOUS EVERY 6 HOURS PRN
Status: DISCONTINUED | OUTPATIENT
Start: 2024-09-17 | End: 2024-09-17

## 2024-09-17 RX ORDER — TRAMADOL HYDROCHLORIDE 50 MG/1
50 TABLET ORAL EVERY 6 HOURS PRN
Qty: 30 TABLET | Refills: 0 | Status: SHIPPED | OUTPATIENT
Start: 2024-09-17 | End: 2024-09-27

## 2024-09-17 RX ORDER — CEFAZOLIN SODIUM 1 G/3ML
INJECTION, POWDER, FOR SOLUTION INTRAMUSCULAR; INTRAVENOUS AS NEEDED
Status: DISCONTINUED | OUTPATIENT
Start: 2024-09-17 | End: 2024-09-19 | Stop reason: SURG

## 2024-09-17 RX ORDER — ONDANSETRON 2 MG/ML
INJECTION INTRAMUSCULAR; INTRAVENOUS
Status: COMPLETED
Start: 2024-09-17 | End: 2024-09-17

## 2024-09-17 RX ORDER — BUPIVACAINE HYDROCHLORIDE 5 MG/ML
INJECTION, SOLUTION EPIDURAL; INTRACAUDAL AS NEEDED
Status: DISCONTINUED | OUTPATIENT
Start: 2024-09-17 | End: 2024-09-17 | Stop reason: HOSPADM

## 2024-09-17 RX ORDER — HYDROCODONE BITARTRATE AND ACETAMINOPHEN 5; 325 MG/1; MG/1
1 TABLET ORAL ONCE AS NEEDED
Status: COMPLETED | OUTPATIENT
Start: 2024-09-17 | End: 2024-09-17

## 2024-09-17 RX ORDER — LIDOCAINE HYDROCHLORIDE 10 MG/ML
INJECTION, SOLUTION INFILTRATION; PERINEURAL AS NEEDED
Status: DISCONTINUED | OUTPATIENT
Start: 2024-09-17 | End: 2024-09-17 | Stop reason: HOSPADM

## 2024-09-17 RX ORDER — TAZAROTENE 1 MG/G
GEL TOPICAL NIGHTLY
COMMUNITY
Start: 2024-02-12

## 2024-09-17 RX ORDER — SODIUM CHLORIDE, SODIUM LACTATE, POTASSIUM CHLORIDE, CALCIUM CHLORIDE 600; 310; 30; 20 MG/100ML; MG/100ML; MG/100ML; MG/100ML
INJECTION, SOLUTION INTRAVENOUS CONTINUOUS PRN
Status: DISCONTINUED | OUTPATIENT
Start: 2024-09-17 | End: 2024-09-19 | Stop reason: SURG

## 2024-09-17 RX ORDER — MIDAZOLAM HYDROCHLORIDE 1 MG/ML
1 INJECTION INTRAMUSCULAR; INTRAVENOUS EVERY 5 MIN PRN
Status: DISCONTINUED | OUTPATIENT
Start: 2024-09-17 | End: 2024-09-17

## 2024-09-17 RX ORDER — ONDANSETRON 2 MG/ML
INJECTION INTRAMUSCULAR; INTRAVENOUS AS NEEDED
Status: DISCONTINUED | OUTPATIENT
Start: 2024-09-17 | End: 2024-09-19 | Stop reason: SURG

## 2024-09-17 RX ORDER — KETOROLAC TROMETHAMINE 15 MG/ML
15 INJECTION, SOLUTION INTRAMUSCULAR; INTRAVENOUS ONCE
Status: COMPLETED | OUTPATIENT
Start: 2024-09-17 | End: 2024-09-17

## 2024-09-17 RX ORDER — HYDROMORPHONE HYDROCHLORIDE 1 MG/ML
0.2 INJECTION, SOLUTION INTRAMUSCULAR; INTRAVENOUS; SUBCUTANEOUS EVERY 5 MIN PRN
Status: DISCONTINUED | OUTPATIENT
Start: 2024-09-17 | End: 2024-09-17

## 2024-09-17 RX ORDER — DIPHENHYDRAMINE HYDROCHLORIDE 50 MG/ML
12.5 INJECTION INTRAMUSCULAR; INTRAVENOUS AS NEEDED
Status: DISCONTINUED | OUTPATIENT
Start: 2024-09-17 | End: 2024-09-17

## 2024-09-17 RX ORDER — MEPERIDINE HYDROCHLORIDE 25 MG/ML
12.5 INJECTION INTRAMUSCULAR; INTRAVENOUS; SUBCUTANEOUS AS NEEDED
Status: DISCONTINUED | OUTPATIENT
Start: 2024-09-17 | End: 2024-09-17

## 2024-09-17 RX ADMIN — DEXAMETHASONE SODIUM PHOSPHATE 8 MG: 4 MG/ML VIAL (ML) INJECTION at 16:05:00

## 2024-09-17 RX ADMIN — LIDOCAINE HYDROCHLORIDE 100 MG: 10 INJECTION, SOLUTION EPIDURAL; INFILTRATION; INTRACAUDAL; PERINEURAL at 16:05:00

## 2024-09-17 RX ADMIN — GLYCOPYRROLATE 0.6 MG: 0.2 INJECTION, SOLUTION INTRAMUSCULAR; INTRAVENOUS at 16:59:00

## 2024-09-17 RX ADMIN — METRONIDAZOLE 500 MG: 500 INJECTION, SOLUTION INTRAVENOUS at 16:16:00

## 2024-09-17 RX ADMIN — KETOROLAC TROMETHAMINE 30 MG: 30 INJECTION, SOLUTION INTRAMUSCULAR; INTRAVENOUS at 17:08:00

## 2024-09-17 RX ADMIN — CEFAZOLIN SODIUM 2 G: 1 INJECTION, POWDER, FOR SOLUTION INTRAMUSCULAR; INTRAVENOUS at 16:16:00

## 2024-09-17 RX ADMIN — SODIUM CHLORIDE, SODIUM LACTATE, POTASSIUM CHLORIDE, CALCIUM CHLORIDE: 600; 310; 30; 20 INJECTION, SOLUTION INTRAVENOUS at 16:01:00

## 2024-09-17 RX ADMIN — ONDANSETRON 4 MG: 2 INJECTION INTRAMUSCULAR; INTRAVENOUS at 16:05:00

## 2024-09-17 RX ADMIN — ROCURONIUM BROMIDE 40 MG: 10 INJECTION, SOLUTION INTRAVENOUS at 16:05:00

## 2024-09-17 RX ADMIN — SODIUM CHLORIDE, SODIUM LACTATE, POTASSIUM CHLORIDE, CALCIUM CHLORIDE: 600; 310; 30; 20 INJECTION, SOLUTION INTRAVENOUS at 16:57:00

## 2024-09-17 RX ADMIN — NEOSTIGMINE METHYLSULFATE 3 MG: 1 INJECTION INTRAVENOUS at 16:59:00

## 2024-09-17 NOTE — ED QUICK NOTES
Orders for admission, patient is aware of plan and ready to go upstairs. Any questions, please call ED RN Anyi at extension [phone].     Patient Covid vaccination status: Fully vaccinated     COVID Test Ordered in ED: None    COVID Suspicion at Admission: N/A    Running Infusions:  None    Mental Status/LOC at time of transport: AOx4    Other pertinent information:   CIWA score: N/A   NIH score:  N/A

## 2024-09-17 NOTE — ED PROVIDER NOTES
Patient Seen in: [facility] Emergency Department      History     Chief Complaint   Patient presents with    Abnormal Result     Stated Complaint:     Subjective:   HPI    34-year-old female comes to the hospital complaint of having difficulty with nausea, vomiting and diarrhea in the last 24 hours.  She went to an urgent care and was given IV fluid and Zofran and was sent here to rule out an appendicitis.  She does have some pain in the right lower quadrant.  She is no longer feeling nauseous after Zofran and IV fluids were given at this time.  Her symptoms been present over the last 2 days.  She denies any headaches or dizziness.  There is no known fevers.  No chest pain or troubles breathing.  She denying any other complaints at this time.  I did review the chart and labs which showed she did have an elevated white count of 16.3 thousand.  Her urine showed no infection and her pregnancy test was negative with unremarkable electrolytes.    Objective:   Past Medical History:    Real time reverse transcriptase PCR positive for COVID-19 virus              Past Surgical History:   Procedure Laterality Date    Ablation  10/03/2019    Polyp removal    Laparoscopic  2011    Ectopic pregnancy                Social History     Socioeconomic History    Marital status:    Tobacco Use    Smoking status: Former    Smokeless tobacco: Never   Vaping Use    Vaping status: Never Used   Substance and Sexual Activity    Alcohol use: Not Currently     Comment: per pt, prior pregnancy once a year.    Drug use: Never    Sexual activity: Yes     Partners: Male              Review of Systems    Positive for stated Chief Complaint: Abnormal Result    Other systems are as noted in HPI.  Constitutional and vital signs reviewed.      All other systems reviewed and negative except as noted above.    Physical Exam     ED Triage Vitals   BP 09/17/24 1038 108/78   Pulse 09/17/24 1038 108   Resp 09/17/24 1038 16   Temp 09/17/24 1040  98 °F (36.7 °C)   Temp src 09/17/24 1040 Temporal   SpO2 09/17/24 1038 99 %   O2 Device 09/17/24 1038 None (Room air)       Current Vitals:   Vital Signs  BP: 106/74  Pulse: 111  Resp: 16  Temp: 98 °F (36.7 °C)  Temp src: Temporal  MAP (mmHg): 86    Oxygen Therapy  SpO2: 100 %  O2 Device: None (Room air)            Physical Exam    HEENT : NCAT, EOMI, PEERL,  neck supple, no JVD, trachea midline, No LAD  Heart: S1S2 normal. No murmurs, regular rate and rhythm  Lungs: Clear to auscultation bilaterally  Abdomen: Soft  right lower quadrant region is tender nondistended normal active bowel sounds without rebound, guarding or masses noted  Back nontender without CVA tenderness  Extremity no clubbing, cyanosis or edema noted.  Full range of motion noted without tenderness  Neuro: No focal deficits noted    All measures to prevent infection transmission during my interaction with the patient were taken.  The patient was already wearing droplet mask on my arrival to the room.  Personal protective equipment including a droplet mask as well as gloves were worn throughout the duration of my exam.  Hand washing was performed prior to and after the exam.  Stethoscope and equipment used during my examination was cleaned with a super Sani cloth germicidal wipe following the exam.    ED Course   Labs Reviewed - No data to display       ED Course as of 09/17/24 1212  ------------------------------------------------------------  Time: 09/17 1211  Comment: While here I did review the patient's lab values that showed an elevated white count of 16.3 thousand.  Her pregnancy test was negative.  She did CT of the abdomen pelvis that I interpreted here consistent with acute appendicitis.  With radiology report as well.  I spoke with surgery at this time the patient will be taken to surgery for further management.     CT ABDOMEN+PELVIS(CONTRAST ONLY)(CPT=74177)    Result Date: 9/17/2024  PROCEDURE:  CT ABDOMEN+PELVIS (CONTRAST ONLY)  (CPT=74177)  COMPARISON:  None.  INDICATIONS:  rlq pain  TECHNIQUE:  CT scanning was performed from the dome of the diaphragm to the pubic symphysis with non-ionic intravenous contrast material. Post contrast coronal MPR imaging was performed.  Dose reduction techniques were used. Dose information is transmitted to the ACR (American College of Radiology) NRDR (National Radiology Data Registry) which includes the Dose Index Registry.  PATIENT STATED HISTORY:(As transcribed by Technologist)  Patient complains of right lower quadrant abdominal pain.   CONTRAST USED:  80cc of Isovue 370  FINDINGS:  LIVER:  No enlargement, atrophy, abnormal density, or significant focal lesion.  BILIARY:  No visible dilatation or calcification.  PANCREAS:  No lesion, fluid collection, ductal dilatation, or atrophy.  SPLEEN:  No enlargement or focal lesion.  KIDNEYS:  No mass, obstruction, or calcification.  ADRENALS:  No mass or enlargement.  AORTA/VASCULAR:  No aneurysm or dissection.  RETROPERITONEUM:  No mass or adenopathy.  BOWEL/MESENTERY:  No evidence of bowel obstruction.  Markedly dilated, hyperemic appendix of the right lower quadrant measures 12 mm in caliber with adjacent fluid and inflammatory stranding consistent with acute appendicitis.  No definitive evidence of appendiceal rupture or regional abscess formation. ABDOMINAL WALL:  No mass or hernia.  URINARY BLADDER:  No visible focal wall thickening, lesion, or calculus.  PELVIC NODES:  No adenopathy.  PELVIC ORGANS:  No visible mass.  Pelvic organs appropriate for patient age.  BONES:  No bony lesion or fracture.  LUNG BASES:  No visible pulmonary or pleural disease.  OTHER:  Negative.             CONCLUSION:   Dilated, hyperemic appendix of the right lower quadrant with adjacent fluid and inflammatory stranding consistent with acute appendicitis.  No definitive features of appendiceal rupture.  No organized regional abscess.  Findings discussed with  at 11:50  a.m. Central standard time on 09/17/2024.   LOCATION:  [place] [address]   Dictated by (CST): Manuel Macario MD on 9/17/2024 at 11:46 AM     Finalized by (CST): Manuel Macario MD on 9/17/2024 at 11:50 AM        Medications   ketorolac (Toradol) 15 MG/ML injection 15 mg (15 mg Intravenous Given 9/17/24 1058)   iopamidol 76% (ISOVUE-370) injection for power injector (80 mL Intravenous Given 9/17/24 1141)              MDM      Differential diagnosis includes acute appendicitis, diverticulitis, gastroenteritis but limited such.  Patient's workup here is consistent with acute appendicitis.  I spoke with Dr. Moreno from surgery at this time the patient be taken to surgery for further care.      This note was prepared using Dragon Medical voice recognition dictation software.  As a result errors may occur.  When identified to these areas have been corrected.  While every attempt is made to correct errors during dictation discrepancies may still exist.  Please contact if there are any errors.                                   Medical Decision Making      Disposition and Plan     Clinical Impression:  1. Acute appendicitis, unspecified acute appendicitis type         Disposition:  Send to or/cath/gi  9/17/2024 12:12 pm    Follow-up:  No follow-up provider specified.        Medications Prescribed:  Current Discharge Medication List

## 2024-09-17 NOTE — ED INITIAL ASSESSMENT (HPI)
Pt states abd pain yesterday with N/V.  Pt denies fevers.  Pt states she was seen at  prior to arrival and given 8mg zofran and 1L IV fluids.

## 2024-09-17 NOTE — DISCHARGE INSTRUCTIONS
You received a drug called Toradol which is an Anti Inflammatory at: 5pm  If you are allowed to take Anti inflammatories:    Do not take any Anti Inflammatory like Motrin, Aleve or Ibuprophen until after: 11pm  Please report any suspected allergic reactions or bleeding issues to your doctor        Home Care Instructions  LAPAROSCOPIC SURGERY    MEDICATIONS  You should anticipate moderate to severe pain for the first few days.  Your surgeon has prescribed for you a pain medication, usually Norco.  Take the pain medication as prescribed.  You may use Ibuprofen (Motrin) 600 mg 3 times a day with the Norco or by itself if needed.  If you experience severe nausea or vomiting stop the pain medication and call our office.    DIET  Your diet should be as you tolerate.  Eat light foods the first 24 hours.  Do not drink alcohol while taking the pain medication.    ACTIVITY  You should not drive for the first 3 to 5 days or if you are still requiring prescription pain medication.    No lifting greater than 10 to 15 pounds for 3 weeks.  Avoid strenuous activity such as running and physical workouts for 3 weeks.  Normal daily activities are fine, such as climbing stairs.  You may shower after 24 hours.  The steri-strips can get wet but should not be under water in a bath.  You may return to work as instructed by your surgeon.    CARE OF SURGICAL SITE  You may remove the outer gauze pad in 24 hours and you may shower over the steri-strips which are directly on the incisions.  Leave these steri-strips on until you are seen in  the office.  Pain, colorful bruising and slight swelling at the incision sites is usually normal.  If you experience fever, chills, inability to urinate, nausea with vomiting, severe diarrhea or severe cramping, abdominal pain, please contact your surgeon.    APPOINTMENT  Call the office when you arrive home after surgery and make an appointment to see your surgeon in one week.  Should you develop any  problems prior to your scheduled appointment, do not hesitate to call the office.      Norco was given to you at: 6:40 pm  Next dose tramadol due: 12:40 am    No driving or drinking alcohol while taking

## 2024-09-17 NOTE — OPERATIVE REPORT
[facility]                                                         Operative Note    Chinedu Garcia Location: ASC Perioperative   CSN [id number] MRN [id number]   Admission Date 9/17/2024 Procedure Date 9/17/2024   Attending Physician Seth Mccracken MD Procedure Physician Seth Mccracken MD     Pre-Operative Diagnosis: Appendicitis [K37]     Post-Operative Diagnosis: Appendicitis [K37]    Procedure Performed: LAPAROSCOPIC APPENDECTOMY    Surgeon: Seth Mccracken MD     Assistant(s):          Anesthesia: General       Complications: none       Estimated Blood Loss: Blood Output: 5 mL (9/17/2024  5:02 PM)              Operative Summary: Patient was taken to the operating room and placed in a supine position.  They were diagnosed with acute appendicitis.  A consent was obtained for a laparoscopic appendectomy.  The skin and deeper tissues were infiltrated with 1% Xylocaine with epinephrine mixed with half percent Marcaine plain just below the umbilicus.  An incision was made and dissection was carried down to the fascia.  The fascia was incised and a blunt-tipped Lloyd trocar was placed in an open technique after stay sutures were placed on either side of the opening.  The abdomen was insufflated with 15 mm of carbon dioxide.  Two 5 mm trochars were placed in the lower abdomen under direct laparoscopic guidance and the patient was positioned.  A bilateral trans-abdominis plane block was performed by infusing 20 cc of a mixture of half percent Marcaine 1% Xylocaine and the trans-abdominis plane bilaterally.  The cecum was appreciated.  The appendix was noted to be acutely inflamed.  There was adjacent phlegmon. No rupture.  The mesoappendix was ligated with the sono incision energy device.  This dissection was carried down to the level of the base of the appendix.  The appendix was then amputated from the cecum with the Endo mechanical TONYA 45 mm stapler.  The appendix was placed in an Endobag and retrieved from the  abdominal cavity.  This was sent to pathology.  The abdomen was then irrigated copiously with a combination of antibiotic irrigation and saline solution.  The staple line was visualized and was intact.  Hemostasis was achieved.  The trochars were removed.  The fascia at the umbilicus was reapproximated with an 0 Vicryl suture.  The wounds were closed with 4-0 Monocryl and Dermabond.  The patient was awoken and taken to the recovery room in satisfactory condition.  There were no complications during the case.          Seth Mccracken MD  9/17/2024  5:19 PM

## 2024-09-17 NOTE — CONSULTS
[facility]  Report of Consultation    Chinedu Garcia Patient Status:  Emergency    1990 MRN [id number]   Formerly [facility] EMERGENCY DEPARTMENT Attending Beto Murray MD   Hosp Day # 0 PCP Kwadwo Villagomez MD     2024    Reason for Consultation:    Surgical Consultation     CC:   Chief Complaint   Patient presents with    Abnormal Result        History of Present Illness:    Chinedu Garcia is a a(n) 34 year old female. Patient complains of abdominal cramping, specific RLQ, nausea and emesis beginning yesterday. She did go to an ER at [facility] and was told the wait would be 10 hrs to be evaluated. She ultimately left at 1 am going home. Evaluated by [facility] today, sent to ER for CT scan  CT scan obtained revealing acute appendicitis   Patient with leukocytosis 15K with left shift.   Previous laparoscopy for ectopic  No use of anticoagulation      Past Medical History:    Past Medical History:    Real time reverse transcriptase PCR positive for COVID-19 virus       Past Surgical History:    Past Surgical History:   Procedure Laterality Date    Ablation  10/03/2019    Polyp removal    Laparoscopic      Ectopic pregnancy       Family History:    Family History   Problem Relation Age of Onset    High Blood Pressure Mother     Diabetes Mother     Thyroid disease Mother         thyroidectomy at age 57       Social History:     reports that she has quit smoking. She has never used smokeless tobacco. She reports that she does not currently use alcohol. She reports that she does not use drugs.    Allergies:    No Known Allergies    Current Medications:      Current Facility-Administered Medications:     ampicillin-sulbactam (Unasyn) 3 g in sodium chloride 0.9% 100mL IVPB-ADD, 3 g, Intravenous, Once    Home Medications:    No current facility-administered medications on file prior to encounter.     Current Outpatient Medications on File Prior to Encounter   Medication Sig Dispense Refill    Adapalene 0.3 %  External Gel Apply pea sized amount to face nightly (Patient not taking: Reported on 9/17/2024) 45 g 3    diclofenac 50 MG Oral Tab EC Take 1 tablet (50 mg total) by mouth 2 (two) times daily as needed (hip pain). (Patient not taking: Reported on 9/17/2024) 60 tablet 1    escitalopram (LEXAPRO) 10 MG Oral Tab Take 1 tablet (10 mg total) by mouth daily. (Patient not taking: Reported on 9/17/2024) 30 tablet 1    ondansetron (ZOFRAN) 4 mg tablet Take 1 tablet (4 mg total) by mouth every 8 (eight) hours as needed for Nausea. (Patient not taking: Reported on 9/17/2024) 10 tablet 0    MELATONIN OR Take by mouth. (Patient not taking: Reported on 9/17/2024)      Ferrous Sulfate 325 (65 Fe) MG Oral Tab Take 325 mg by mouth daily with breakfast. (Patient not taking: Reported on 9/17/2024)      MAGNESIUM OR Take by mouth. (Patient not taking: Reported on 9/17/2024)      Prenatal Vit-Fe Fumarate-FA (SM PRENATAL VITAMINS) 28-0.8 MG Oral Tab Take by mouth. (Patient not taking: Reported on 9/17/2024)      dibucaine 1 % External Ointment Apply 1 Application topically 3 (three) times daily. (Patient not taking: Reported on 9/17/2024) 1 each 0    Benzocaine-Menthol (DERMOPLAST) 20-0.5 % External Aerosol Apply 1 spray topically 4 (four) times daily as needed. (Patient not taking: Reported on 9/17/2024) 1 each 0       Review of Systems:    10 point review performed pertinent positives and negatives per history of present illness.    Physical Exam:    Blood pressure 106/74, pulse 111, temperature 98 °F (36.7 °C), temperature source Temporal, resp. rate 16, height 5' 6\" (1.676 m), weight 140 lb (63.5 kg), last menstrual period 08/18/2024, SpO2 100%, not currently breastfeeding.    GENERAL: Alert and oriented x 3, well developed, well nourished female, in no apparent distress    SKIN: anicteric    RESPIRATORY: non labored breathing    CARDIOVASCULAR: RRR    ABDOMEN: soft, mildly distended, tenderness in RLQ with guarding     LYMPHATIC: no  lymphadenopathy    EXTREMITIES: no cyanosis, clubbing or edema    .    Laboratory Data:  WBC 15K  Radiology:    CT ABDOMEN+PELVIS(CONTRAST ONLY)(CPT=74177)    Result Date: 9/17/2024  PROCEDURE:  CT ABDOMEN+PELVIS (CONTRAST ONLY) (CPT=74177)  COMPARISON:  None.  INDICATIONS:  rlq pain  TECHNIQUE:  CT scanning was performed from the dome of the diaphragm to the pubic symphysis with non-ionic intravenous contrast material. Post contrast coronal MPR imaging was performed.  Dose reduction techniques were used. Dose information is transmitted to the ACR (American College of Radiology) NRDR (National Radiology Data Registry) which includes the Dose Index Registry.  PATIENT STATED HISTORY:(As transcribed by Technologist)  Patient complains of right lower quadrant abdominal pain.   CONTRAST USED:  80cc of Isovue 370  FINDINGS:  LIVER:  No enlargement, atrophy, abnormal density, or significant focal lesion.  BILIARY:  No visible dilatation or calcification.  PANCREAS:  No lesion, fluid collection, ductal dilatation, or atrophy.  SPLEEN:  No enlargement or focal lesion.  KIDNEYS:  No mass, obstruction, or calcification.  ADRENALS:  No mass or enlargement.  AORTA/VASCULAR:  No aneurysm or dissection.  RETROPERITONEUM:  No mass or adenopathy.  BOWEL/MESENTERY:  No evidence of bowel obstruction.  Markedly dilated, hyperemic appendix of the right lower quadrant measures 12 mm in caliber with adjacent fluid and inflammatory stranding consistent with acute appendicitis.  No definitive evidence of appendiceal rupture or regional abscess formation. ABDOMINAL WALL:  No mass or hernia.  URINARY BLADDER:  No visible focal wall thickening, lesion, or calculus.  PELVIC NODES:  No adenopathy.  PELVIC ORGANS:  No visible mass.  Pelvic organs appropriate for patient age.  BONES:  No bony lesion or fracture.  LUNG BASES:  No visible pulmonary or pleural disease.  OTHER:  Negative.             CONCLUSION:   Dilated, hyperemic appendix of the  right lower quadrant with adjacent fluid and inflammatory stranding consistent with acute appendicitis.  No definitive features of appendiceal rupture.  No organized regional abscess.  Findings discussed with  at 11:50 a.m. Central standard time on 09/17/2024.   LOCATION:  [place] [id number]   Dictated by (CST): Manuel Macario MD on 9/17/2024 at 11:46 AM     Finalized by (CST): Manuel Macario MD on 9/17/2024 at 11:50 AM          Problem List:    Patient Active Problem List   Diagnosis   (none) - all problems resolved or deleted       Impression:    33 y/o with acute appendicitis  CT scan as noted above  Leukocytosis with left shift  Appears non toxic, soft, mildly distended with tenderness in RLQ    Plan:    Will proceed with surgical management, risks of surgery discussed with patient including bleeding, infection, injury to surrounding tissue, conversion to open incision   Obtain informed consent  NPO  IV fluids  Pre op antibiotics   All questions answered      DESTINY Katz  [facility]  General Surgery  9/17/2024

## 2024-09-17 NOTE — ANESTHESIA PREPROCEDURE EVALUATION
PRE-OP EVALUATION    Patient Name: Chinedu Garcia    Admit Diagnosis: Acute appendicitis, unspecified acute appendicitis type [K35.80]    Pre-op Diagnosis: Appendicitis [K37]    LAPAROSCOPIC APPENDECTOMY    Anesthesia Procedure: LAPAROSCOPIC APPENDECTOMY (Abdomen)    Surgeons and Role:     * Seth Mccracken MD - Primary    Pre-op vitals reviewed.  Temp: 98 °F (36.7 °C)  Pulse: 110  Resp: 16  BP: 116/72  SpO2: 99 %  Body mass index is 22.6 kg/m².    Current medications reviewed.  Hospital Medications:   [COMPLETED] ketorolac (Toradol) 15 MG/ML injection 15 mg  15 mg Intravenous Once    [COMPLETED] iopamidol 76% (ISOVUE-370) injection for power injector  80 mL Intravenous ONCE PRN    [COMPLETED] ampicillin-sulbactam (Unasyn) 3 g in sodium chloride 0.9% 100mL IVPB-ADD  3 g Intravenous Once    [Transfer Hold] HYDROmorphone (Dilaudid) 1 MG/ML injection 0.5 mg  0.5 mg Intravenous Q30 Min PRN    lidocaine PF (Xylocaine-MPF) 1% injection   Injection PRN    fentaNYL (Sublimaze) 50 mcg/mL injection   Intravenous PRN    propofol (Diprivan) 10 MG/ML injection   Intravenous PRN    rocuronium (Zemuron) 50 mg/5mL injection   Intravenous PRN    dexamethasone (Decadron) 4 MG/ML injection   Intravenous PRN    ondansetron (Zofran) 4 MG/2ML injection   Intravenous PRN    lactated ringers infusion   Intravenous Continuous PRN       Outpatient Medications:     Medications Prior to Admission   Medication Sig Dispense Refill Last Dose    Tazarotene 0.1 % External Gel Apply topically nightly. to face.   9/15/2024 at PM    traZODone 100 MG Oral Tab Take 1 tablet (100 mg total) by mouth nightly as needed for Sleep.   Past Month    Drospirenone-Ethinyl Estradiol 3-0.02 MG Oral Tab Take 1 tablet by mouth daily. (Patient not taking: Reported on 9/17/2024)   Not Taking       Allergies: Patient has no known allergies.      Anesthesia Evaluation    Patient summary reviewed.    Anesthetic Complications           GI/Hepatic/Renal                                  Cardiovascular                                                       Endo/Other                                  Pulmonary                           Neuro/Psych                                      Past Surgical History:   Procedure Laterality Date    Ablation  10/03/2019    Polyp removal    Laparoscopic  2011    Ectopic pregnancy     Social History     Socioeconomic History    Marital status:    Tobacco Use    Smoking status: Former    Smokeless tobacco: Never   Vaping Use    Vaping status: Never Used   Substance and Sexual Activity    Alcohol use: Not Currently     Comment: per pt, prior pregnancy once a year.    Drug use: Never    Sexual activity: Yes     Partners: Male     History   Drug Use Unknown     Available pre-op labs reviewed.               Airway      Mallampati: II  Mouth opening: >3 FB  TM distance: > 6 cm  Neck ROM: full Cardiovascular    Cardiovascular exam normal.         Dental             Pulmonary    Pulmonary exam normal.                 Other findings              ASA: 1   Plan: general  NPO status verified and           Plan/risks discussed with: patient                Present on Admission:  **None**

## 2024-09-17 NOTE — ED QUICK NOTES
Pt declares that she is not pregnant and will go to CT without a preg test. Pt took urine preg at  yesterday.   Double Island Pedicle Flap Text: The defect edges were debeveled with a #15 scalpel blade.  Given the location of the defect, shape of the defect and the proximity to free margins a double island pedicle advancement flap was deemed most appropriate.  Using a sterile surgical marker, an appropriate advancement flap was drawn incorporating the defect, outlining the appropriate donor tissue and placing the expected incisions within the relaxed skin tension lines where possible.    The area thus outlined was incised deep to adipose tissue with a #15 scalpel blade.  The skin margins were undermined to an appropriate distance in all directions around the primary defect and laterally outward around the island pedicle utilizing iris scissors.  There was minimal undermining beneath the pedicle flap.

## 2024-09-17 NOTE — ANESTHESIA PROCEDURE NOTES
Airway  Date/Time: 9/17/2024 4:08 PM  Urgency: elective      General Information and Staff    Patient location during procedure: OR  Anesthesiologist: Elijah Christopher MD  Performed: anesthesiologist   Performed by: Elijah Christopher MD  Authorized by: Elijah Christopher MD      Indications and Patient Condition  Indications for airway management: anesthesia  Sedation level: deep  Preoxygenated: yes  Patient position: sniffing  Mask difficulty assessment: 1 - vent by mask    Final Airway Details  Final airway type: endotracheal airway      Successful airway: ETT  Cuffed: yes   Successful intubation technique: direct laryngoscopy  Endotracheal tube insertion site: oral  Blade: GlideScope  Blade size: #3  ETT size (mm): 7.0    Placement verified by: capnometry   Measured from: lips  ETT to lips (cm): 21  Number of attempts at approach: 1

## 2024-09-19 NOTE — ANESTHESIA POSTPROCEDURE EVALUATION
[facility] Patient Status:  Emergency   Age/Gender 34 year old female MRN [id number]   Location [facility] PERIOPERATIVE SERVICE Attending No att. providers found   Hosp Day # 0 PCP Kwadwo Villagomez MD       Anesthesia Post-op Note    LAPAROSCOPIC APPENDECTOMY    Procedure Summary       Date: 09/17/24 Room / Location:  MAIN OR 15 / [facility] MAIN OR    Anesthesia Start: 1601 Anesthesia Stop: 1722    Procedure: LAPAROSCOPIC APPENDECTOMY (Abdomen) Diagnosis:       Appendicitis      (Appendicitis [K37])    Surgeons: Seth Mccracken MD Responsible Provider: Elijah Christopher MD    Anesthesia Type: general ASA Status: 1            Anesthesia Type: general    Vitals Value Taken Time   /77 09/17/24 1845   Temp 97.1 °F (36.2 °C) 09/17/24 1754   Pulse 63 09/17/24 1845   Resp 20 09/17/24 1754   SpO2 100 % 09/17/24 1845       Patient Location: PACU    Anesthesia Type: general    Airway Patency: extubated    Postop Pain Control: adequate    Mental Status: preanesthetic baseline    Nausea/Vomiting: none    Cardiopulmonary/Hydration status: stable euvolemic    Complications: no apparent anesthesia related complications    Postop vital signs: stable    Dental Exam: Unchanged from Preop

## 2024-11-23 ENCOUNTER — APPOINTMENT (OUTPATIENT)
Dept: GENERAL RADIOLOGY | Facility: HOSPITAL | Age: 34
End: 2024-11-23
Attending: EMERGENCY MEDICINE
Payer: COMMERCIAL

## 2024-11-23 ENCOUNTER — HOSPITAL ENCOUNTER (EMERGENCY)
Facility: HOSPITAL | Age: 34
Discharge: HOME | End: 2024-11-23
Attending: EMERGENCY MEDICINE
Payer: COMMERCIAL

## 2024-11-23 VITALS
DIASTOLIC BLOOD PRESSURE: 79 MMHG | TEMPERATURE: 99 F | BODY MASS INDEX: 22.5 KG/M2 | HEART RATE: 91 BPM | RESPIRATION RATE: 20 BRPM | HEIGHT: 66.14 IN | OXYGEN SATURATION: 100 % | SYSTOLIC BLOOD PRESSURE: 104 MMHG | WEIGHT: 140 LBS

## 2024-11-23 DIAGNOSIS — S82.839A AVULSION FRACTURE OF DISTAL FIBULA: Primary | ICD-10-CM

## 2024-11-23 PROCEDURE — 73610 X-RAY EXAM OF ANKLE: CPT | Performed by: EMERGENCY MEDICINE

## 2024-11-23 PROCEDURE — 99284 EMERGENCY DEPT VISIT MOD MDM: CPT

## 2024-11-23 RX ORDER — IBUPROFEN 600 MG/1
600 TABLET, FILM COATED ORAL ONCE
Status: COMPLETED | OUTPATIENT
Start: 2024-11-23 | End: 2024-11-23

## 2024-11-23 NOTE — ED PROVIDER NOTES
Patient Seen in: [facility] Emergency Department      History     Chief Complaint   Patient presents with    Ankle Injury    Fall     Stated Complaint: fall, ankle injury    Subjective:   HPI      This is a 34-year-old female who presents emergency room for evaluation of right ankle injury.  Patient states she tripped while she was walking down 3 stairs, rolled her right ankle, describes inversion type injury.  States that it is painful when she tries to bear weight.  Denies knee or hip injury.  Denies striking her head denies any other injuries.  Denies numbness tingling weakness.    Objective:     Past Medical History:    Real time reverse transcriptase PCR positive for COVID-19 virus              Past Surgical History:   Procedure Laterality Date    Ablation  10/03/2019    Polyp removal    Laparoscopic  2011    Ectopic pregnancy                Social History     Socioeconomic History    Marital status:    Tobacco Use    Smoking status: Former    Smokeless tobacco: Never   Vaping Use    Vaping status: Never Used   Substance and Sexual Activity    Alcohol use: Not Currently     Comment: per pt, prior pregnancy once a year.    Drug use: Never    Sexual activity: Yes     Partners: Male                  Physical Exam     ED Triage Vitals [11/23/24 1114]   /83   Pulse 84   Resp 20   Temp 99.1 °F (37.3 °C)   Temp src Oral   SpO2 100 %   O2 Device None (Room air)       Current Vitals:   Vital Signs  BP: 118/81  Pulse: 93  Resp: 20  Temp: 99.1 °F (37.3 °C)  Temp src: Oral  MAP (mmHg): 90    Oxygen Therapy  SpO2: 100 %  O2 Device: None (Room air)        Physical Exam  GENERAL: Patient is awake, alert, well-appearing, in no acute distress.  HEENT:  no scleral icterus.  Mucous membranes are moist.  Scalp is atraumatic.  HEART: Regular rate and rhythm, no murmurs.  LUNGS: Clear to auscultation bilaterally.  No Rales, no rhonchi, no wheezing, no stridor.  EXTREMITIES: No tenderness to the right hip, thigh,  knee, proximal or midshaft tib-fib.  No tenderness over the calf or Achilles tendon.  There is tenderness with swelling over the lateral right ankle without discrete tenderness at the tip of the medial or lateral malleolus.  Ankle joint is stable.  No tenderness to base of fifth metatarsal or any bones of foot.  Dorsal pedal pulse present, sensation intact to the lower extremity.  All compartments are soft  ED Course   Labs Reviewed - No data to display         The patient's splint was checked after placement and was noted to have good placement.  Distal circulation and neurovascular exam was noted to be intact.         MDM        Differential diagnosis before testing includes but not limited to ankle sprain, fracture, dislocation, subluxation, which is a medical condition that poses a threat to life/function    Radiographic images  I personally reviewed the radiographs and my individual interpretation shows ankle x-ray reviewed, avulsion fracture noted to the lateral malleolus  I also reviewed the official reports that showed small avulsion fracture of the mid lateral portion of the lateral malleolus/distal fibula.  Soft tissue swelling noted.    Medications Provided: Ibuprofen    Course of Events during Emergency Room Visit include patient is given ibuprofen for pain, patient did decline pregnancy testing reporting there is no way she was pregnant.  X-rays performed, I discussed x-ray results with the patient.  Patient was placed in a short leg posterior mold splint and crutches provided.  Discussed supportive care including icing, alternate ibuprofen and Tylenol and elevation.  Patient to call orthopedics/podiatry Monday morning for an appointment.  Return to ER if any change or worsening symptoms.  Patient agrees with plan and was discharged good condition    Shared decision making was utilized         Discharge  I have discussed with the patient the results of test, differential diagnosis, treatment plan,  warning signs and symptoms which should prompt immediate return.  They expressed understanding of these instructions and agrees to the following plan provided.  They were given written discharge instructions and agrees to return for any concerns and voiced understanding and all questions were answered.    Note to patient: The 21st Century Cures Act makes medical notes like these available to patients in the interest of transparency. However, this is a medical document intended as peer to peer communication. It is written in medical language and may contain abbreviations or verbiage that are unfamiliar. It may appear blunt or direct. Medical documents are intended to carry relevant information, facts as evident, and the clinical opinion of the practitioner.                 Medical Decision Making      Disposition and Plan     Clinical Impression:  1. Avulsion fracture of distal fibula         Disposition:  Discharge  11/23/2024 12:53 pm    Follow-up:  Guero Michelle, LAZARO  [address] [address]  [phone]    Call in 1 day(s)            Medications Prescribed:  Current Discharge Medication List              Supplementary Documentation:

## 2024-11-23 NOTE — ED INITIAL ASSESSMENT (HPI)
Pt in from home. Pt fell down the last 3 stairs in her home this morning and hurt her right ankle. Pt reports 5/10 pain. Ice pack applied. Right ankle is swollen.

## (undated) DEVICE — POUCH SPECIMEN WIRE 6X3 250ML

## (undated) DEVICE — HUNTER GASPER TIP, DISPOSABLE: Brand: RENEW

## (undated) DEVICE — ARTICULATION RELOAD WITH TRI-STAPLE TECHNOLOGY: Brand: ENDO GIA

## (undated) DEVICE — SYRINGE MED 20ML STD CLR PLAS LL TIP N CTRL

## (undated) DEVICE — POWER SHELL: Brand: SIGNIA

## (undated) DEVICE — TROCAR: Brand: KII® SLEEVE

## (undated) DEVICE — SOLUTION IRRIG 1000ML 0.9% NACL USP BTL

## (undated) DEVICE — TROCAR: Brand: KII FIOS FIRST ENTRY

## (undated) DEVICE — COVER,LIGHT,CAMERA,HARD,1/PK,STRL: Brand: MEDLINE

## (undated) DEVICE — STANDARD HYPODERMIC NEEDLE,POLYPROPYLENE HUB: Brand: MONOJECT

## (undated) DEVICE — GLOVE SUR 8 SENSICARE PI PIP CRM PWD F ORTH

## (undated) DEVICE — ANTIBACTERIAL VIOLET BRAIDED (POLYGLACTIN 910), SYNTHETIC ABSORBABLE SUTURE: Brand: COATED VICRYL

## (undated) DEVICE — SUT MCRYL 4-0 18IN PS-2 ABSRB UD 19MM 3/8 CIR

## (undated) DEVICE — 40580 - THE PINK PAD - ADVANCED TRENDELENBURG POSITIONING KIT: Brand: 40580 - THE PINK PAD - ADVANCED TRENDELENBURG POSITIONING KIT

## (undated) DEVICE — SLEEVE COMPR MD KNEE LEN SGL USE KENDALL SCD

## (undated) DEVICE — LAP CHOLE/APPY CDS-LF: Brand: MEDLINE INDUSTRIES, INC.

## (undated) DEVICE — ADHESIVE SKIN TOP FOR WND CLSR DERMBND ADV

## (undated) DEVICE — TROCARS: Brand: KII® BLUNT TIP ACCESS SYSTEM

## (undated) DEVICE — CURVED JAW CORDLESS ULTRASONIC DISSECTOR: Brand: SONICISION 7

## (undated) NOTE — LETTER
[address]  [address]  Authorization for Surgical Operation and Procedure     Date:___________                                                                                                         Time:__________  I hereby authorize Surgeon(s):  Seth Mccracken MD, my physician and his/her assistants (if applicable), which may include medical students, residents, and/or fellows, to perform the following surgical operation/ procedure and administer such anesthesia as may be determined necessary by my physician:  Operation/Procedure name (s) Procedure(s):  LAPAROSCOPIC APPENDECTOMY  POSSIBLE OPEN  on Imane Amajoud   2.   I recognize that during the surgical operation/procedure, unforeseen conditions may necessitate additional or different procedures than those listed above.  I, therefore, further authorize and request that the above-named surgeon, assistants, or designees perform such procedures as are, in their judgment, necessary and desirable.    3.   My surgeon/physician has discussed prior to my surgery the potential benefits, risks and side effects of this procedure; the likelihood of achieving goals; and potential problems that might occur during recuperation.  They also discussed reasonable alternatives to the procedure, including risks, benefits, and side effects related to the alternatives and risks related to not receiving this procedure.  I have had all my questions answered and I acknowledge that no guarantee has been made as to the result that may be obtained.    4.   Should the need arise during my operation/procedure, which includes change of level of care prior to discharge, I also consent to the administration of blood and/or blood products.  Further, I understand that despite careful testing and screening of blood or blood products by collecting agencies, I may still be subject to ill effects as a result of receiving a blood transfusion and/or blood  products.  The following are some, but not all, of the potential risks that can occur: fever and allergic reactions, hemolytic reactions, transmission of diseases such as Hepatitis, AIDS and Cytomegalovirus (CMV) and fluid overload.  In the event that I wish to have an autologous transfusion of my own blood, or a directed donor transfusion, I will discuss this with my physician.  Check only if Refusing Blood or Blood Products  I understand refusal of blood or blood products as deemed necessary by my physician may have serious consequences to my condition to include possible death. I hereby assume responsibility for my refusal and release the hospital, its personnel, and my physicians from any responsibility for the consequences of my refusal.          o  Refuse      5.   I authorize the use of any specimen, organs, tissues, body parts or foreign objects that may be removed from my body during the operation/procedure for diagnosis, research or teaching purposes and their subsequent disposal by hospital authorities.  I also authorize the release of specimen test results and/or written reports to my treating physician on the hospital medical staff or other referring or consulting physicians involved in my care, at the discretion of the Pathologist or my treating physician.    6.   I consent to the photographing or videotaping of the operations or procedures to be performed, including appropriate portions of my body for medical, scientific, or educational purposes, provided my identity is not revealed by the pictures or by descriptive texts accompanying them.  If the procedure has been photographed/videotaped, the surgeon will obtain the original picture, image, videotape or CD.  The hospital will not be responsible for storage, release or maintenance of the picture, image, tape or CD.    7.   I consent to the presence of a  or observers in the operating room as deemed necessary by my physician or  their designees.    8.   I recognize that in the event my procedure results in extended X-Ray/fluoroscopy time, I may develop a skin reaction.    9. If I have a Do Not Attempt Resuscitation (DNAR) order in place, that status will be suspended while in the operating room, procedural suite, and during the recovery period unless otherwise explicitly stated by me (or a person authorized to consent on my behalf). The surgeon or my attending physician will determine when the applicable recovery period ends for purposes of reinstating the DNAR order.  10. Patients having a sterilization procedure: I understand that if the procedure is successful the results will be permanent and it will therefore be impossible for me to inseminate, conceive, or bear children.  I also understand that the procedure is intended to result in sterility, although the result has not been guaranteed.   11. I acknowledge that my physician has explained sedation/analgesia administration to me including the risk and benefits I consent to the administration of sedation/analgesia as may be necessary or desirable in the judgment of my physician.    I CERTIFY THAT I HAVE READ AND FULLY UNDERSTAND THE ABOVE CONSENT TO OPERATION and/or OTHER PROCEDURE.    _________________________________________  __________________________________  Signature of Patient     Signature of Responsible Person         ___________________________________         Printed Name of Responsible Person           _________________________________                 Relationship to Patient  _________________________________________  ______________________________  Signature of Witness          Date  Time      Patient Name: Chinedu Garcia     : 1990                 Printed: 2024     Medical Record #: [id number]                     Page 1 of [address] [place] [address]  [id number]    Consent for Anesthesia    I,  Chinedu Garcia agree to be cared for by an anesthesiologist, who is specially trained to monitor me and give me medicine to put me to sleep or keep me comfortable during my procedure    I understand that my anesthesiologist is not an employee or agent of [facility] or [employer]. He or she works for [employer].    As the patient asking for anesthesia services, I agree to:  Allow the anesthesiologist (anesthesia doctor) to give me medicine and do additional procedures as necessary. Some examples are: Starting or using an “IV” to give me medicine, fluids or blood during my procedure, and having a breathing tube placed to help me breathe when I’m asleep (intubation). In the event that my heart stops working properly, I understand that my anesthesiologist will make every effort to sustain my life, unless otherwise directed by [facility] Do Not Resuscitate documents.  Tell my anesthesia doctor before my procedure:  If I am pregnant.  The last time that I ate or drank.  All of the medicines I take (including prescriptions, herbal supplements, and pills I can buy without a prescription (including street drugs/illegal medications). Failure to inform my anesthesiologist about these medicines may increase my risk of anesthetic complications.  If I am allergic to anything or have had a reaction to anesthesia before.  I understand how the anesthesia medicine will help me (benefits).  I understand that with any type of anesthesia medicine there are risks:  The most common risks are: nausea, vomiting, sore throat, muscle soreness, damage to my eyes, mouth, or teeth (from breathing tube placement).  Rare risks include: remembering what happened during my procedure, allergic reactions to medications, injury to my airway, heart, lungs, vision, nerves, or muscles and in extremely rare instances death.  My doctor has explained to me other choices available to me for my care  (alternatives).  Pregnant Patients (“epidural”):  I understand that the risks of having an epidural (medicine given into my back to help control pain during labor), include itching, low blood pressure, difficulty urinating, headache or slowing of the baby’s heart. Very rare risks include infection, bleeding, seizure, irregular heart rhythms and nerve injury.  Regional Anesthesia (“spinal”, “epidural”, & “nerve blocks”):  I understand that rare but potential complications include headache, bleeding, infection, seizure, irregular heart rhythms, and nerve injury.    I can change my mind about having anesthesia services at any time before I get the medicine.    _____________________________________________________________________________  Patient (or Representative) Signature/Relationship to Patient  Date   Time    _____________________________________________________________________________   Name (if used)    Language/Organization   Time    _____________________________________________________________________________  Anesthesiologist Signature     Date   Time  I have discussed the procedure and information above with the patient (or patient’s representative) and answered their questions. The patient or their representative has agreed to have anesthesia services.    _____________________________________________________________________________  Witness        Date   Time  I have verified that the signature is that of the patient or patient’s representative, and that it was signed before the procedure  Patient Name: Chinedu Garcia     : 1990                 Printed: 2024     Medical Record #: [id number]                     Page 2 of 2